# Patient Record
Sex: FEMALE | Race: WHITE | NOT HISPANIC OR LATINO
[De-identification: names, ages, dates, MRNs, and addresses within clinical notes are randomized per-mention and may not be internally consistent; named-entity substitution may affect disease eponyms.]

---

## 2018-06-11 ENCOUNTER — APPOINTMENT (OUTPATIENT)
Dept: MRI IMAGING | Facility: CLINIC | Age: 63
End: 2018-06-11
Payer: MEDICARE

## 2018-06-11 ENCOUNTER — OUTPATIENT (OUTPATIENT)
Dept: OUTPATIENT SERVICES | Facility: HOSPITAL | Age: 63
LOS: 1 days | End: 2018-06-11
Payer: MEDICARE

## 2018-06-11 DIAGNOSIS — C50.911 MALIGNANT NEOPLASM OF UNSPECIFIED SITE OF RIGHT FEMALE BREAST: ICD-10-CM

## 2018-06-11 PROCEDURE — 0159T: CPT | Mod: 26

## 2018-06-11 PROCEDURE — C8908: CPT

## 2018-06-11 PROCEDURE — 82565 ASSAY OF CREATININE: CPT

## 2018-06-11 PROCEDURE — A9585: CPT

## 2018-06-11 PROCEDURE — 77059 MRI BREAST BILATERAL: CPT | Mod: 26

## 2018-06-11 PROCEDURE — C8937: CPT

## 2018-06-15 ENCOUNTER — APPOINTMENT (OUTPATIENT)
Dept: MRI IMAGING | Facility: IMAGING CENTER | Age: 63
End: 2018-06-15
Payer: MEDICARE

## 2018-06-15 ENCOUNTER — OUTPATIENT (OUTPATIENT)
Dept: OUTPATIENT SERVICES | Facility: HOSPITAL | Age: 63
LOS: 1 days | End: 2018-06-15
Payer: MEDICARE

## 2018-06-15 ENCOUNTER — RESULT REVIEW (OUTPATIENT)
Age: 63
End: 2018-06-15

## 2018-06-15 DIAGNOSIS — R92.8 OTHER ABNORMAL AND INCONCLUSIVE FINDINGS ON DIAGNOSTIC IMAGING OF BREAST: ICD-10-CM

## 2018-06-15 PROCEDURE — 19085 BX BREAST 1ST LESION MR IMAG: CPT | Mod: RT

## 2018-06-15 PROCEDURE — 88305 TISSUE EXAM BY PATHOLOGIST: CPT

## 2018-06-15 PROCEDURE — 77065 DX MAMMO INCL CAD UNI: CPT

## 2018-06-15 PROCEDURE — 77065 DX MAMMO INCL CAD UNI: CPT | Mod: 26,RT

## 2018-06-15 PROCEDURE — 19085 BX BREAST 1ST LESION MR IMAG: CPT

## 2018-06-15 PROCEDURE — 88305 TISSUE EXAM BY PATHOLOGIST: CPT | Mod: 26

## 2018-06-18 LAB — SURGICAL PATHOLOGY STUDY: SIGNIFICANT CHANGE UP

## 2018-09-07 ENCOUNTER — EMERGENCY (EMERGENCY)
Facility: HOSPITAL | Age: 63
LOS: 1 days | Discharge: HOME | End: 2018-09-07
Attending: EMERGENCY MEDICINE | Admitting: INTERNAL MEDICINE
Payer: MEDICARE

## 2018-09-07 VITALS
HEART RATE: 94 BPM | SYSTOLIC BLOOD PRESSURE: 132 MMHG | TEMPERATURE: 98 F | OXYGEN SATURATION: 98 % | RESPIRATION RATE: 18 BRPM | DIASTOLIC BLOOD PRESSURE: 81 MMHG

## 2018-09-07 DIAGNOSIS — Z98.890 OTHER SPECIFIED POSTPROCEDURAL STATES: Chronic | ICD-10-CM

## 2018-09-07 DIAGNOSIS — Z90.710 ACQUIRED ABSENCE OF BOTH CERVIX AND UTERUS: Chronic | ICD-10-CM

## 2018-09-07 LAB
APTT BLD: > 200 SEC (ref 27.5–37.4)
BASOPHILS # BLD AUTO: 0.1 K/UL — SIGNIFICANT CHANGE UP (ref 0–0.2)
BASOPHILS NFR BLD AUTO: 0.6 % — SIGNIFICANT CHANGE UP (ref 0–2)
EOSINOPHIL # BLD AUTO: 0.1 K/UL — SIGNIFICANT CHANGE UP (ref 0–0.5)
EOSINOPHIL NFR BLD AUTO: 1.2 % — SIGNIFICANT CHANGE UP (ref 0–6)
GAS PNL BLDV: SIGNIFICANT CHANGE UP
HCT VFR BLD CALC: 47.1 % — HIGH (ref 34.5–45)
HGB BLD-MCNC: 15.6 G/DL — HIGH (ref 11.5–15.5)
INR BLD: 1.21 RATIO — HIGH (ref 0.88–1.16)
LYMPHOCYTES # BLD AUTO: 18.3 % — SIGNIFICANT CHANGE UP (ref 13–44)
LYMPHOCYTES # BLD AUTO: 2 K/UL — SIGNIFICANT CHANGE UP (ref 1–3.3)
MCHC RBC-ENTMCNC: 28.1 PG — SIGNIFICANT CHANGE UP (ref 27–34)
MCHC RBC-ENTMCNC: 33.1 GM/DL — SIGNIFICANT CHANGE UP (ref 32–36)
MCV RBC AUTO: 84.7 FL — SIGNIFICANT CHANGE UP (ref 80–100)
MONOCYTES # BLD AUTO: 0.7 K/UL — SIGNIFICANT CHANGE UP (ref 0–0.9)
MONOCYTES NFR BLD AUTO: 6.3 % — SIGNIFICANT CHANGE UP (ref 2–14)
NEUTROPHILS # BLD AUTO: 8 K/UL — HIGH (ref 1.8–7.4)
NEUTROPHILS NFR BLD AUTO: 73.6 % — SIGNIFICANT CHANGE UP (ref 43–77)
NT-PROBNP SERPL-SCNC: 320 PG/ML — HIGH (ref 0–300)
PLATELET # BLD AUTO: 186 K/UL — SIGNIFICANT CHANGE UP (ref 150–400)
PROTHROM AB SERPL-ACNC: 13.1 SEC — HIGH (ref 9.8–12.7)
RBC # BLD: 5.56 M/UL — HIGH (ref 3.8–5.2)
RBC # FLD: 13.7 % — SIGNIFICANT CHANGE UP (ref 10.3–14.5)
WBC # BLD: 10.9 K/UL — HIGH (ref 3.8–10.5)
WBC # FLD AUTO: 10.9 K/UL — HIGH (ref 3.8–10.5)

## 2018-09-07 RX ORDER — HEPARIN SODIUM 5000 [USP'U]/ML
10000 INJECTION INTRAVENOUS; SUBCUTANEOUS EVERY 6 HOURS
Qty: 0 | Refills: 0 | Status: DISCONTINUED | OUTPATIENT
Start: 2018-09-07 | End: 2018-09-08

## 2018-09-07 RX ORDER — HEPARIN SODIUM 5000 [USP'U]/ML
INJECTION INTRAVENOUS; SUBCUTANEOUS
Qty: 25000 | Refills: 0 | Status: DISCONTINUED | OUTPATIENT
Start: 2018-09-07 | End: 2018-09-08

## 2018-09-07 RX ORDER — HEPARIN SODIUM 5000 [USP'U]/ML
5000 INJECTION INTRAVENOUS; SUBCUTANEOUS EVERY 6 HOURS
Qty: 0 | Refills: 0 | Status: DISCONTINUED | OUTPATIENT
Start: 2018-09-07 | End: 2018-09-08

## 2018-09-07 NOTE — ED PROVIDER NOTE - MEDICAL DECISION MAKING DETAILS
64 yo F c PMH of HTN, varicose veins (baseline LLE swelling), recently dx with breast ca (s/p lumpectomy in July 2018), not on chemotx, scheduled for upcoming radiation transferred via EMS from [facility] where she was found to have PE and bilat DVTs after having SOB. On exam, HR 94, SPO2 94 when pt was talking was placed on 3LNC, pt already heparin loaded. labs, ekg, and cxr pending; will continue AC and admit for further w/u

## 2018-09-07 NOTE — ED PROVIDER NOTE - PROGRESS NOTE DETAILS
Roel Blas, PGY3: PTT>200, heparin held. Discussed with cards fellow. Will hold until repeat ptt on floor in PICU. Admitting to Dr. Alcazar

## 2018-09-07 NOTE — ED PROVIDER NOTE - ATTENDING CONTRIBUTION TO CARE
64 yo F c PMH of HTN, varicose veins (baseline LLE swelling), recently dx with breast ca (s/p lumpectomy in July 2018), not on chemotx, scheduled for upcoming radiation transferred via EMS from [facility] for hemodynamically stable pe, with b/l le dvt. Discussed with cards who accepted patient on heparin drip, labs ordered.

## 2018-09-07 NOTE — ED PROVIDER NOTE - OBJECTIVE STATEMENT
64 yo F c PMH of HTN, varicose veins (baseline LLE swelling), recently dx with breast ca (s/p lumpectomy in July 2018), not on chemotx, scheduled for upcoming radiation. 64 yo F c PMH of HTN, varicose veins (baseline LLE swelling), recently dx with breast ca (s/p lumpectomy in July 2018), not on chemotx, scheduled for upcoming radiation transferred via EMS from [facility] where she was found to have PE and bilat DVTs (is s/p 25,000 units heparin loading at 15 mL/hour) after she had SOB that started at noon today while she was at a meeting. no personal or FH of DVT or PE. not on AC. no recent travel.     ROS positive: SOB, generalized weakness  ROS negative: f/c, congestion, coryza, pharyngitis, hemoptysis, CP, hemoptysis, abdominal pain, n/v, dysuria, hematuria, increased leg swelling from baseline, syncope

## 2018-09-07 NOTE — ED ADULT NURSE NOTE - OBJECTIVE STATEMENT
63y female with recently dx breast ca s/p lumpectomy right side transferred from [facility] for bilateral PEs and DVTs. Pt is alert and oriented x 4 and speaking coherently. 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 pt was found to have the PEs and DVTs and given heparin at [facility]. Pt is in nad, vss. pt denies cp, sob, n/v/d, fevers, chills. heparin paused in [place] ER until results of coagulation blood work returns. Pt sitting comfortably, NC applied for comfort. swelling noted to b/l legs. swelling greater to right leg. right leg warm to touch and reddened. PT on CM-nsr. 63y female with recently dx breast ca s/p lumpectomy right side transferred from [facility] for bilateral PEs and DVTs. Pt is alert and oriented x 4 and speaking coherently. 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 pt was found to have the PEs and DVTs and given heparin at [facility]. Pt is in nad, vss. pt denies cp, sob, n/v/d, fevers, chills. heparin paused in [place] ER until results of coagulation blood work returns. Pt sitting comfortably, NC applied for comfort. swelling noted to b/l legs. swelling greater to right leg. right leg warm to touch and reddened. PT on CM-nsr. md lovell completed. will reassess.

## 2018-09-08 ENCOUNTER — TRANSCRIPTION ENCOUNTER (OUTPATIENT)
Age: 63
End: 2018-09-08

## 2018-09-08 DIAGNOSIS — I26.99 OTHER PULMONARY EMBOLISM WITHOUT ACUTE COR PULMONALE: ICD-10-CM

## 2018-09-08 DIAGNOSIS — C50.919 MALIGNANT NEOPLASM OF UNSPECIFIED SITE OF UNSPECIFIED FEMALE BREAST: ICD-10-CM

## 2018-09-08 DIAGNOSIS — I82.4Y3 ACUTE EMBOLISM AND THROMBOSIS OF UNSPECIFIED DEEP VEINS OF PROXIMAL LOWER EXTREMITY, BILATERAL: ICD-10-CM

## 2018-09-08 LAB
ALBUMIN SERPL ELPH-MCNC: 4.1 G/DL — SIGNIFICANT CHANGE UP (ref 3.3–5)
ALP SERPL-CCNC: 99 U/L — SIGNIFICANT CHANGE UP (ref 40–120)
ALT FLD-CCNC: 19 U/L — SIGNIFICANT CHANGE UP (ref 10–45)
ANION GAP SERPL CALC-SCNC: 13 MMOL/L — SIGNIFICANT CHANGE UP (ref 5–17)
ANION GAP SERPL CALC-SCNC: 17 MMOL/L — SIGNIFICANT CHANGE UP (ref 5–17)
APTT BLD: 175.8 SEC — CRITICAL HIGH (ref 27.5–37.4)
AST SERPL-CCNC: 28 U/L — SIGNIFICANT CHANGE UP (ref 10–40)
BILIRUB SERPL-MCNC: 0.4 MG/DL — SIGNIFICANT CHANGE UP (ref 0.2–1.2)
BLD GP AB SCN SERPL QL: NEGATIVE — SIGNIFICANT CHANGE UP
BUN SERPL-MCNC: 13 MG/DL — SIGNIFICANT CHANGE UP (ref 7–23)
BUN SERPL-MCNC: 15 MG/DL — SIGNIFICANT CHANGE UP (ref 7–23)
CALCIUM SERPL-MCNC: 9.4 MG/DL — SIGNIFICANT CHANGE UP (ref 8.4–10.5)
CALCIUM SERPL-MCNC: 9.4 MG/DL — SIGNIFICANT CHANGE UP (ref 8.4–10.5)
CHLORIDE SERPL-SCNC: 104 MMOL/L — SIGNIFICANT CHANGE UP (ref 96–108)
CHLORIDE SERPL-SCNC: 106 MMOL/L — SIGNIFICANT CHANGE UP (ref 96–108)
CHOLEST SERPL-MCNC: 186 MG/DL — SIGNIFICANT CHANGE UP (ref 10–199)
CO2 SERPL-SCNC: 19 MMOL/L — LOW (ref 22–31)
CO2 SERPL-SCNC: 23 MMOL/L — SIGNIFICANT CHANGE UP (ref 22–31)
CREAT SERPL-MCNC: 0.85 MG/DL — SIGNIFICANT CHANGE UP (ref 0.5–1.3)
CREAT SERPL-MCNC: 0.88 MG/DL — SIGNIFICANT CHANGE UP (ref 0.5–1.3)
GLUCOSE SERPL-MCNC: 114 MG/DL — HIGH (ref 70–99)
GLUCOSE SERPL-MCNC: 114 MG/DL — HIGH (ref 70–99)
HBA1C BLD-MCNC: 5.4 % — SIGNIFICANT CHANGE UP (ref 4–5.6)
HCT VFR BLD CALC: 43.7 % — SIGNIFICANT CHANGE UP (ref 34.5–45)
HDLC SERPL-MCNC: 45 MG/DL — LOW
HGB BLD-MCNC: 14.8 G/DL — SIGNIFICANT CHANGE UP (ref 11.5–15.5)
INR BLD: 1.11 RATIO — SIGNIFICANT CHANGE UP (ref 0.88–1.16)
LIPID PNL WITH DIRECT LDL SERPL: 120 MG/DL — SIGNIFICANT CHANGE UP
MAGNESIUM SERPL-MCNC: 2.2 MG/DL — SIGNIFICANT CHANGE UP (ref 1.6–2.6)
MCHC RBC-ENTMCNC: 28.7 PG — SIGNIFICANT CHANGE UP (ref 27–34)
MCHC RBC-ENTMCNC: 33.8 GM/DL — SIGNIFICANT CHANGE UP (ref 32–36)
MCV RBC AUTO: 84.9 FL — SIGNIFICANT CHANGE UP (ref 80–100)
PHOSPHATE SERPL-MCNC: 3.6 MG/DL — SIGNIFICANT CHANGE UP (ref 2.5–4.5)
PLATELET # BLD AUTO: 176 K/UL — SIGNIFICANT CHANGE UP (ref 150–400)
POTASSIUM SERPL-MCNC: 4.1 MMOL/L — SIGNIFICANT CHANGE UP (ref 3.5–5.3)
POTASSIUM SERPL-MCNC: 4.6 MMOL/L — SIGNIFICANT CHANGE UP (ref 3.5–5.3)
POTASSIUM SERPL-SCNC: 4.1 MMOL/L — SIGNIFICANT CHANGE UP (ref 3.5–5.3)
POTASSIUM SERPL-SCNC: 4.6 MMOL/L — SIGNIFICANT CHANGE UP (ref 3.5–5.3)
PROT SERPL-MCNC: 7.2 G/DL — SIGNIFICANT CHANGE UP (ref 6–8.3)
PROTHROM AB SERPL-ACNC: 12 SEC — SIGNIFICANT CHANGE UP (ref 9.8–12.7)
RBC # BLD: 5.14 M/UL — SIGNIFICANT CHANGE UP (ref 3.8–5.2)
RBC # FLD: 13.8 % — SIGNIFICANT CHANGE UP (ref 10.3–14.5)
RH IG SCN BLD-IMP: POSITIVE — SIGNIFICANT CHANGE UP
SODIUM SERPL-SCNC: 140 MMOL/L — SIGNIFICANT CHANGE UP (ref 135–145)
SODIUM SERPL-SCNC: 142 MMOL/L — SIGNIFICANT CHANGE UP (ref 135–145)
TOTAL CHOLESTEROL/HDL RATIO MEASUREMENT: 4.1 RATIO — SIGNIFICANT CHANGE UP (ref 3.3–7.1)
TRIGL SERPL-MCNC: 104 MG/DL — SIGNIFICANT CHANGE UP (ref 10–149)
TROPONIN T, HIGH SENSITIVITY RESULT: 108 NG/L — HIGH (ref 0–51)
TROPONIN T, HIGH SENSITIVITY RESULT: 198 NG/L — HIGH (ref 0–51)
WBC # BLD: 8.1 K/UL — SIGNIFICANT CHANGE UP (ref 3.8–10.5)
WBC # FLD AUTO: 8.1 K/UL — SIGNIFICANT CHANGE UP (ref 3.8–10.5)

## 2018-09-08 PROCEDURE — 93306 TTE W/DOPPLER COMPLETE: CPT | Mod: 26,76

## 2018-09-08 RX ORDER — HEPARIN SODIUM 5000 [USP'U]/ML
4500 INJECTION INTRAVENOUS; SUBCUTANEOUS EVERY 6 HOURS
Qty: 0 | Refills: 0 | Status: DISCONTINUED | OUTPATIENT
Start: 2018-09-08 | End: 2018-09-08

## 2018-09-08 RX ORDER — ENOXAPARIN SODIUM 100 MG/ML
120 INJECTION SUBCUTANEOUS EVERY 12 HOURS
Qty: 0 | Refills: 0 | Status: DISCONTINUED | OUTPATIENT
Start: 2018-09-08 | End: 2018-09-09

## 2018-09-08 RX ORDER — HEPARIN SODIUM 5000 [USP'U]/ML
9500 INJECTION INTRAVENOUS; SUBCUTANEOUS EVERY 6 HOURS
Qty: 0 | Refills: 0 | Status: DISCONTINUED | OUTPATIENT
Start: 2018-09-08 | End: 2018-09-08

## 2018-09-08 RX ORDER — HEPARIN SODIUM 5000 [USP'U]/ML
INJECTION INTRAVENOUS; SUBCUTANEOUS
Qty: 25000 | Refills: 0 | Status: DISCONTINUED | OUTPATIENT
Start: 2018-09-08 | End: 2018-09-08

## 2018-09-08 RX ORDER — INFLUENZA VIRUS VACCINE 15; 15; 15; 15 UG/.5ML; UG/.5ML; UG/.5ML; UG/.5ML
0.5 SUSPENSION INTRAMUSCULAR ONCE
Qty: 0 | Refills: 0 | Status: COMPLETED | OUTPATIENT
Start: 2018-09-08 | End: 2018-09-08

## 2018-09-08 RX ORDER — HEPARIN SODIUM 5000 [USP'U]/ML
1800 INJECTION INTRAVENOUS; SUBCUTANEOUS
Qty: 25000 | Refills: 0 | Status: DISCONTINUED | OUTPATIENT
Start: 2018-09-08 | End: 2018-09-08

## 2018-09-08 RX ADMIN — ENOXAPARIN SODIUM 120 MILLIGRAM(S): 100 INJECTION SUBCUTANEOUS at 14:49

## 2018-09-08 RX ADMIN — HEPARIN SODIUM 1400 UNIT(S)/HR: 5000 INJECTION INTRAVENOUS; SUBCUTANEOUS at 09:30

## 2018-09-08 RX ADMIN — HEPARIN SODIUM 0 UNIT(S)/HR: 5000 INJECTION INTRAVENOUS; SUBCUTANEOUS at 08:27

## 2018-09-08 RX ADMIN — HEPARIN SODIUM 1800 UNIT(S)/HR: 5000 INJECTION INTRAVENOUS; SUBCUTANEOUS at 01:47

## 2018-09-08 NOTE — H&P ADULT - NSHPPHYSICALEXAM_GEN_ALL_CORE
General: Resting comfortably in bed, NAD  Neurology: A&Ox4, nonfocal, TALLEY x 4  Respiratory: No increased respiratory effort, on nasal cannula 4L/min, CTA b/l without rales, wheezes, or rhonchi  CV: RRR, S1S2, no murmurs, rubs or gallops  Abdominal: Soft, NT, ND +BS  Extremities: No edema, + peripheral pulses, RLE>LLE

## 2018-09-08 NOTE — H&P ADULT - NSHPSOCIALHISTORY_GEN_ALL_CORE
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 Denies tobacco, alcohol or substance abuse.

## 2018-09-08 NOTE — CHART NOTE - NSCHARTNOTEFT_GEN_A_CORE
====================  CCU MIDNIGHT ROUNDS  ====================    EDMUND RODRIGUEZ  [id number]    ====================  SUMMARY: HPI:  Pt is a 64 y/o F with PMH of HTN, varicose veins (baseline RLE swelling), recently dx with breast ca (s/p right lumpectomy 7/5/18), no chemo, scheduled for upcoming radiation transferred via EMS from [facility] where she was found to have PEs and b/l proximal LE DVTs, s/p 25,000 U heparin loading at 15 mL/hour). Patient initially had ALVAREZ around 2pm yesterday while at work ([facility]), resolved with rest but upon walking to her car experienced increased ALVAREZ accompanied by anxiety. Pt call her PMD who instructed her to go to the ED. Denies personal or FH of DVT or PE. Not on AC. No recent travel but admit to more sedentary lifestyle recently due to the hot weather. Pt was on continued heparin gtt at 22 mL/hr in [facility] ED, PTT >200 and held for 2 hours. Heparin restarted at 18 ml/hr upon arrival to [facility]. Currently pt denies fevers, chills, CP, SOB, palpitations, abd pain, n/v/d, weakness, LE pain, numbness or tingling. (08 Sep 2018 01:23)    ====================        ====================  NEW EVENTS:  ====================  Resting comfortably in chair without SOB, CP, palpitations. Improved ALVAREZ with walking to bathroom throughout the day. RLE with occasional aching.       ====================  VITALS (Last 12 hrs):  ====================    T(C): 37.1 (09-08-18 @ 20:00), Max: 37.1 (09-08-18 @ 20:00)  HR: 81 (09-08-18 @ 21:00) (77 - 86)  BP: 112/69 (09-08-18 @ 21:00) (91/73 - 138/119)  BP(mean): 83 (09-08-18 @ 21:00) (81 - 127)  RR: 20 (09-08-18 @ 19:00) (13 - 26)  SpO2: 97% (09-08-18 @ 21:00) (95% - 100%)      TELEMETRY: NSR rate 80s      I&O's Summary    07 Sep 2018 07:01  -  08 Sep 2018 07:00  --------------------------------------------------------  IN: 108 mL / OUT: 50 mL / NET: 58 mL    08 Sep 2018 07:01  -  08 Sep 2018 22:13  --------------------------------------------------------  IN: 434 mL / OUT: 600 mL / NET: -166 mL        ====================  PLAN:  ====================    #Pulmonary embolisms  - CTA Chest [facility] shows PE in distal Left Pulm Artery involving L Upper and Lower lobe seg arteries; PE in R seg branches of Upper, Middle and Lower lobes.  - cont Lovenox 120mg SC BID  - Vasc Intervention consulted.   - Will likely DC home in AM with Lovenox therapy    #DVTs  - DVT, lower extremity, proximal, acute, bilateral.  Plan: [facility] CT Venogram shows R prox and distal superficial femoral vein and popliteal vein with defects consistent with DVT. L distal superficial vein and popliteal vein with defects suggestive of possible DVT  - Lovenox ordered.       Wes Mejia, CCU PA   #[id number]

## 2018-09-08 NOTE — H&P ADULT - ASSESSMENT
63F with PMH of HTN, varicose veins (baseline RLE swelling), recently dx with breast ca (s/p lumpectomy 7/5/18), no chemo, scheduled for upcoming radiation transferred from OSH with b/l prox LE DVTs with multiple PEs on CTPA, currently stable.

## 2018-09-08 NOTE — GOALS OF CARE CONVERSATION - PERSONAL ADVANCE DIRECTIVE - CONVERSATION DETAILS
Weekend Covering SW     As per H & P, "Pt is a 62 y/o F with PMH of HTN, varicose veins (baseline RLE swelling), recently dx with breast ca (s/p right lumpectomy 7/5/18), no chemo, scheduled for upcoming radiation transferred via EMS from [facility] where she was found to have PEs and b/l proximal LE DVTs, s/p 25,000 U heparin loading at 15 mL/hour). Patient initially had ALVAREZ around 2pm yesterday while at work ([employer]), resolved with rest but upon walking to her car experienced increased ALVAREZ accompanied by anxiety. Pt call her PMD who instructed her to go to the ED. Denies personal or FH of DVT or PE. Not on AC. No recent travel but admit to more sedentary lifestyle recently due to the hot weather. Pt was on continued heparin gtt at 22 mL/hr in [facility] ED, PTT >200 and held for 2 hours. Heparin restarted at 18 ml/hr upon arrival to [facility]. Currently pt denies fevers, chills, CP, SOB, palpitations, abd pain, n/v/d, weakness, LE pain, numbness or tingling."  Patient seen for Day 1 Assessment.  She is unable to identify a caregiver as she has no children and has been the primary caregiver for her spouse.     LCSW completed HCP with patient. Patient designated her  Roscoe Barron as primary -[phone] and her sister Linnea Mei [phone] as her alterate.  Original plus 2 copies returned to patient. Copy of HCP placed on chart and copy scanned into [employer].       Patient lives in private home 1/2 step outside, Patient and bedroom on the first floor. No assitive devices for ambulation for patient prior to admission. No prior homecare or aide services in place. Patient has been primary caregiver for spouse who has multiple medical needs.  She does not have any children.     Spouse with carotid disease, COPD, current smoker, heart disease, sleep apena, cyst on kidney, neuropathy, diabetic, problems with collapsed vertebrae in spine, Spouse uses wheelchair outside the house and unable to drive due to neurpathy in feet. Wife was 's caregiver.  Patient does all grocery shopping, laundry. Patient drives. She denies any homecare services or assistance in the home.  Spouse can microwave meals while patient in hospital.  Spouse is a .  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  Patient was working on [employer].     Patient with recent new dx of breast cancer  in April. Surgery in June.  Scheduled to start radiation on 9/17.      PCP is Dr. Alireza Yusuf - [phone]. Patient requests primary team contact her PCP to provide updates and collaborate.     Patient hasn't eaten since last night and expresses hunger.  Her cell phone numer is [phone].    Supportive counseling provided for medical decline.  Patient may benefit from some homecare services upon discharge pending hospital course. No additional needs or concerns identified at this time.  Discharge as per primary CM/SW.

## 2018-09-08 NOTE — PROGRESS NOTE ADULT - SUBJECTIVE AND OBJECTIVE BOX
CHIEF COMPLAINT::    INTERVAL HISTORY:    REVIEW OF SYSTEMS: all others negative    MEDICATIONS  (STANDING):  heparin  Infusion. 1800 Unit(s)/Hr (18 mL/Hr) IV Continuous <Continuous>  influenza   Vaccine 0.5 milliLiter(s) IntraMuscular once    MEDICATIONS  (PRN):  heparin  Injectable 9500 Unit(s) IV Push every 6 hours PRN For aPTT less than 40  heparin  Injectable 4500 Unit(s) IV Push every 6 hours PRN For aPTT between 40 - 57      Objective:  Vital Signs Last 24 Hrs  T(C): 36.7 (08 Sep 2018 06:00), Max: 36.7 (07 Sep 2018 22:47)  T(F): 98 (08 Sep 2018 06:00), Max: 98 (07 Sep 2018 22:47)  HR: 82 (08 Sep 2018 07:00) (78 - 98)  BP: 119/79 (08 Sep 2018 07:00) (110/80 - 132/81)  BP(mean): 93 (08 Sep 2018 07:00) (80 - 96)  RR: 16 (08 Sep 2018 07:00) (15 - 21)  SpO2: 100% (08 Sep 2018 07:00) (92% - 100%)  ICU Vital Signs Last 24 Hrs  T(C): 36.7 (08 Sep 2018 06:00), Max: 36.7 (07 Sep 2018 22:47)  T(F): 98 (08 Sep 2018 06:00), Max: 98 (07 Sep 2018 22:47)  HR: 82 (08 Sep 2018 07:00) (78 - 98)  BP: 119/79 (08 Sep 2018 07:00) (110/80 - 132/81)  BP(mean): 93 (08 Sep 2018 07:00) (80 - 96)  ABP: --  ABP(mean): --  RR: 16 (08 Sep 2018 07:00) (15 - 21)  SpO2: 100% (08 Sep 2018 07:00) (92% - 100%)      Adult Advanced Hemodynamics Last 24 Hrs  CVP(mm Hg): --  CVP(cm H2O): --  CO: --  CI: --  PA: --  PA(mean): --  PCWP: --  SVR: --  SVRI: --  PVR: --  PVRI: --      09-07 @ 07:01  -  09-08 @ 07:00  --------------------------------------------------------  IN: 90 mL / OUT: 50 mL / NET: 40 mL      Daily Height in cm: 167.64 (08 Sep 2018 01:15)    Daily     PHYSICAL EXAM:      Constitutional:    Eyes:    ENMT:    Neck:    Breasts:    Back:    Respiratory:    Cardiovascular:    Gastrointestinal:    Genitourinary:    Rectal:    Extremities:    Vascular:    Neurological:    Skin:    Lymph Nodes:    Musculoskeletal:    Psychiatric:        TELEMETRY:     EKG:     CARDIAC CATH:     ECHO:    IMAGING:                           15.6   10.9  )-----------( 186      ( 07 Sep 2018 23:19 )             47.1     09-07    140  |  104  |  15  ----------------------------<  114<H>  4.6   |  19<L>  |  0.88    Ca    9.4      07 Sep 2018 23:19    TPro  7.2  /  Alb  4.1  /  TBili  0.4  /  DBili  x   /  AST  28  /  ALT  19  /  AlkPhos  99  09-07    LIVER FUNCTIONS - ( 07 Sep 2018 23:19 )  Alb: 4.1 g/dL / Pro: 7.2 g/dL / ALK PHOS: 99 U/L / ALT: 19 U/L / AST: 28 U/L / GGT: x           PT/INR - ( 07 Sep 2018 23:19 )   PT: 13.1 sec;   INR: 1.21 ratio         PTT - ( 07 Sep 2018 23:19 )  PTT:> 200 sec      *BLOOD GAS/ARTERIAL/MIXED/VENOUS  *LACTATE      HEALTH ISSUES - PROBLEM Dx:  Pulmonary thromboembolism  Breast CA: Breast CA  DVT, lower extremity, proximal, acute, bilateral: DVT, lower extremity, proximal, acute, bilateral  Pulmonary embolism: Pulmonary embolism        HEALTH ISSUES - R/O PROBLEM Dx:      JOÃO ContrerasU NP   # CHIEF COMPLAINT::    INTERVAL HISTORY:  This is a 63 year old woman with PMH of HTN, varicose veins (baseline RLE swelling), recently dx with breast ca (s/p right lumpectomy 7/5/18), no chemo, scheduled for upcoming radiation transferred via EMS from [facility] where she was found to have PEs and b/l proximal LE DVTs, s/p 25,000 U heparin loading at 15 mL/hour). Patient initially had ALVAREZ around 2pm yesterday while at work ([facility]), resolved with rest but upon walking to her car experienced increased ALVAREZ accompanied by anxiety. Pt call her PMD who instructed her to go to the ED. Denies personal or FH of DVT or PE. Not on AC. No recent travel but admit to more sedentary lifestyle recently due to the hot weather. Pt started on Heparin.     REVIEW OF SYSTEMS: all others negative    MEDICATIONS  (STANDING):  heparin  Infusion. 1800 Unit(s)/Hr (18 mL/Hr) IV Continuous <Continuous>  influenza   Vaccine 0.5 milliLiter(s) IntraMuscular once    MEDICATIONS  (PRN):  heparin  Injectable 9500 Unit(s) IV Push every 6 hours PRN For aPTT less than 40  heparin  Injectable 4500 Unit(s) IV Push every 6 hours PRN For aPTT between 40 - 57      Objective:  Vital Signs Last 24 Hrs  T(C): 36.7 (08 Sep 2018 06:00), Max: 36.7 (07 Sep 2018 22:47)  T(F): 98 (08 Sep 2018 06:00), Max: 98 (07 Sep 2018 22:47)  HR: 82 (08 Sep 2018 07:00) (78 - 98)  BP: 119/79 (08 Sep 2018 07:00) (110/80 - 132/81)  BP(mean): 93 (08 Sep 2018 07:00) (80 - 96)  RR: 16 (08 Sep 2018 07:00) (15 - 21)  SpO2: 100% (08 Sep 2018 07:00) (92% - 100%)  ICU Vital Signs Last 24 Hrs  T(C): 36.7 (08 Sep 2018 06:00), Max: 36.7 (07 Sep 2018 22:47)  T(F): 98 (08 Sep 2018 06:00), Max: 98 (07 Sep 2018 22:47)  HR: 82 (08 Sep 2018 07:00) (78 - 98)  BP: 119/79 (08 Sep 2018 07:00) (110/80 - 132/81)  BP(mean): 93 (08 Sep 2018 07:00) (80 - 96)  ABP: --  ABP(mean): --  RR: 16 (08 Sep 2018 07:00) (15 - 21)  SpO2: 100% (08 Sep 2018 07:00) (92% - 100%)      Adult Advanced Hemodynamics Last 24 Hrs  CVP(mm Hg): --  CVP(cm H2O): --  CO: --  CI: --  PA: --  PA(mean): --  PCWP: --  SVR: --  SVRI: --  PVR: --  PVRI: --      09-07 @ 07:01  -  09-08 @ 07:00  --------------------------------------------------------  IN: 90 mL / OUT: 50 mL / NET: 40 mL      Daily Height in cm: 167.64 (08 Sep 2018 01:15)    Daily     PHYSICAL EXAM:      Constitutional:    Eyes:    ENMT:    Neck:    Breasts:    Back:    Respiratory:    Cardiovascular:    Gastrointestinal:    Genitourinary:    Rectal:    Extremities:    Vascular:    Neurological:    Skin:    Lymph Nodes:    Musculoskeletal:    Psychiatric:        TELEMETRY:     EKG:     CARDIAC CATH:     ECHO:    IMAGING:                           15.6   10.9  )-----------( 186      ( 07 Sep 2018 23:19 )             47.1     09-07    140  |  104  |  15  ----------------------------<  114<H>  4.6   |  19<L>  |  0.88    Ca    9.4      07 Sep 2018 23:19    TPro  7.2  /  Alb  4.1  /  TBili  0.4  /  DBili  x   /  AST  28  /  ALT  19  /  AlkPhos  99  09-07    LIVER FUNCTIONS - ( 07 Sep 2018 23:19 )  Alb: 4.1 g/dL / Pro: 7.2 g/dL / ALK PHOS: 99 U/L / ALT: 19 U/L / AST: 28 U/L / GGT: x           PT/INR - ( 07 Sep 2018 23:19 )   PT: 13.1 sec;   INR: 1.21 ratio         PTT - ( 07 Sep 2018 23:19 )  PTT:> 200 sec      *BLOOD GAS/ARTERIAL/MIXED/VENOUS  *LACTATE      HEALTH ISSUES - PROBLEM Dx:  Pulmonary thromboembolism  Breast CA: Breast CA  DVT, lower extremity, proximal, acute, bilateral: DVT, lower extremity, proximal, acute, bilateral  Pulmonary embolism: Pulmonary embolism        HEALTH ISSUES - R/O PROBLEM Dx:      Edwin Livingston, CCU NP   # CHIEF COMPLAINT: Pulmonary Embolism    INTERVAL HISTORY:  This is a 63 year old woman with PMH of HTN, varicose veins (baseline RLE swelling), recently dx with breast ca (s/p right lumpectomy 7/5/18), no chemo, scheduled for upcoming radiation transferred via EMS from [facility] where she was found to have PEs and b/l proximal LE DVTs, s/p 25,000 U heparin loading at 15 mL/hour). Patient initially had ALVAREZ around 2pm yesterday while at work ([facility]), resolved with rest but upon walking to her car experienced increased ALVAREZ accompanied by anxiety. Pt call her PMD who instructed her to go to the ED. Denies personal or FH of DVT or PE. Not on AC. No recent travel but admit to more sedentary lifestyle recently due to the hot weather. Pt started on Heparin.     REVIEW OF SYSTEMS: Denies CP, SOB, all others negative    MEDICATIONS  (STANDING):  heparin  Infusion. 1800 Unit(s)/Hr (18 mL/Hr) IV Continuous <Continuous>  influenza   Vaccine 0.5 milliLiter(s) IntraMuscular once    MEDICATIONS  (PRN):  heparin  Injectable 9500 Unit(s) IV Push every 6 hours PRN For aPTT less than 40  heparin  Injectable 4500 Unit(s) IV Push every 6 hours PRN For aPTT between 40 - 57      Objective:  Vital Signs Last 24 Hrs  T(C): 36.7 (08 Sep 2018 06:00), Max: 36.7 (07 Sep 2018 22:47)  T(F): 98 (08 Sep 2018 06:00), Max: 98 (07 Sep 2018 22:47)  HR: 82 (08 Sep 2018 07:00) (78 - 98)  BP: 119/79 (08 Sep 2018 07:00) (110/80 - 132/81)  BP(mean): 93 (08 Sep 2018 07:00) (80 - 96)  RR: 16 (08 Sep 2018 07:00) (15 - 21)  SpO2: 100% (08 Sep 2018 07:00) (92% - 100%)  ICU Vital Signs Last 24 Hrs  T(C): 36.7 (08 Sep 2018 06:00), Max: 36.7 (07 Sep 2018 22:47)  T(F): 98 (08 Sep 2018 06:00), Max: 98 (07 Sep 2018 22:47)  HR: 82 (08 Sep 2018 07:00) (78 - 98)  BP: 119/79 (08 Sep 2018 07:00) (110/80 - 132/81)  BP(mean): 93 (08 Sep 2018 07:00) (80 - 96)  ABP: --  ABP(mean): --  RR: 16 (08 Sep 2018 07:00) (15 - 21)  SpO2: 100% (08 Sep 2018 07:00) (92% - 100%)      Adult Advanced Hemodynamics Last 24 Hrs  CVP(mm Hg): --  CVP(cm H2O): --  CO: --  CI: --  PA: --  PA(mean): --  PCWP: --  SVR: --  SVRI: --  PVR: --  PVRI: --      09-07 @ 07:01  -  09-08 @ 07:00  --------------------------------------------------------  IN: 90 mL / OUT: 50 mL / NET: 40 mL      Daily Height in cm: 167.64 (08 Sep 2018 01:15)    Daily     PHYSICAL EXAM:      Constitutional: No acute distress    Neuro A+O X 3    Respiratory: CTA Bilaterally    Cardiovascular: S1S2 RRR    Gastrointestinal: Distended, non tender, +BS    Extremities: Trace edema    Vascular: +1 pedal pulses        TELEMETRY: SR 70's    EKG:     CARDIAC CATH:     ECHO:    IMAGING:                           15.6   10.9  )-----------( 186      ( 07 Sep 2018 23:19 )             47.1     09-07    140  |  104  |  15  ----------------------------<  114<H>  4.6   |  19<L>  |  0.88    Ca    9.4      07 Sep 2018 23:19    TPro  7.2  /  Alb  4.1  /  TBili  0.4  /  DBili  x   /  AST  28  /  ALT  19  /  AlkPhos  99  09-07    LIVER FUNCTIONS - ( 07 Sep 2018 23:19 )  Alb: 4.1 g/dL / Pro: 7.2 g/dL / ALK PHOS: 99 U/L / ALT: 19 U/L / AST: 28 U/L / GGT: x           PT/INR - ( 07 Sep 2018 23:19 )   PT: 13.1 sec;   INR: 1.21 ratio         PTT - ( 07 Sep 2018 23:19 )  PTT:> 200 sec      *BLOOD GAS/ARTERIAL/MIXED/VENOUS  *LACTATE      HEALTH ISSUES - PROBLEM Dx:  Pulmonary thromboembolism  Breast CA: Breast CA  DVT, lower extremity, proximal, acute, bilateral: DVT, lower extremity, proximal, acute, bilateral  Pulmonary embolism: Pulmonary embolism        HEALTH ISSUES - R/O PROBLEM Dx:      Edwin Livingston, CCU NP   #[id number]

## 2018-09-08 NOTE — H&P ADULT - PROBLEM SELECTOR PLAN 2
- cont heparin gtt full AC nomogram as above - CT venogram at OSH with DVT in right prox and distal superficial femoral vein and popliteal vein. Probable DVT in left superficial femoral vein and popliteal vein.  - cont heparin gtt full AC nomogram as above

## 2018-09-08 NOTE — H&P ADULT - HISTORY OF PRESENT ILLNESS
62 yo F c PMH of HTN, varicose veins (baseline LLE swelling), recently dx with breast ca (s/p lumpectomy in July 2018), not on chemotx, scheduled for upcoming radiation transferred via EMS from [facility] where she was found to have PE and bilat DVTs (is s/p 25,000 units heparin loading at 15 mL/hour) after she had SOB that started at noon today while she was at a meeting. no personal or FH of DVT or PE. not on AC. no recent travel. Pt is a 64 y/o F with PMH of HTN, varicose veins (baseline RLE swelling), recently dx with breast ca (s/p right lumpectomy 7/5/18), no chemo, scheduled for upcoming radiation transferred via EMS from [facility] where she was found to have PEs and b/l proximal LE DVTs, s/p 25,000 U heparin loading at 15 mL/hour). Patient initially had ALVAREZ around 2pm yesterday while at work ([facility]), resolved with rest but upon walking to her car experienced increased ALVAREZ accompanied by anxiety. Pt call her PMD who instructed her to go to the ED. Denies personal or FH of DVT or PE. Not on AC. No recent travel but admit to more sedentary lifestyle recently due to the hot weather. Pt was on continued heparin gtt at 22 mL/hr in [facility] ED, PTT >200 and held for 2 hours. Heparin restarted at 18 ml/hr upon arrival to [facility]. Currently pt denies fevers, chills, CP, SOB, palpitations, abd pain, n/v/d, weakness, LE pain, numbness or tingling.

## 2018-09-08 NOTE — PROGRESS NOTE ADULT - PROBLEM SELECTOR PLAN 1
CTA Chest NUMC shows PE in distal Left Pulm Artery involving L Upper and Lower lobe seg arteries; PE in R seg branches of Upper, Middle and Lower lobes.  Heparin gtts convert to Lovenox 120mg SC BID  Vasc Intervention consulted

## 2018-09-08 NOTE — H&P ADULT - NSHPLABSRESULTS_GEN_ALL_CORE
15.6   10.9  )-----------( 186      ( 07 Sep 2018 23:19 )             47.1       09-07    140  |  104  |  15  ----------------------------<  114<H>  4.6   |  19<L>  |  0.88    Ca    9.4      07 Sep 2018 23:19    TPro  7.2  /  Alb  4.1  /  TBili  0.4  /  DBili  x   /  AST  28  /  ALT  19  /  AlkPhos  99  09-07                  PT/INR - ( 07 Sep 2018 23:19 )   PT: 13.1 sec;   INR: 1.21 ratio         PTT - ( 07 Sep 2018 23:19 )  PTT:> 200 sec      TELEMETRY: NSR with PVCs      IMAGING    < from: Xray Chest 1 View- PORTABLE-Urgent (09.07.18 @ 23:34) >    ROCEDURE DATE:  09/07/2018      ******PRELIMINARY REPORT******    ******PRELIMINARY REPORT******           INTERPRETATION:  clear lungs    < end of copied text > 15.6   10.9  )-----------( 186      ( 07 Sep 2018 23:19 )             47.1       09-07    140  |  104  |  15  ----------------------------<  114<H>  4.6   |  19<L>  |  0.88    Ca    9.4      07 Sep 2018 23:19    TPro  7.2  /  Alb  4.1  /  TBili  0.4  /  DBili  x   /  AST  28  /  ALT  19  /  AlkPhos  99  09-07                  PT/INR - ( 07 Sep 2018 23:19 )   PT: 13.1 sec;   INR: 1.21 ratio         PTT - ( 07 Sep 2018 23:19 )  PTT:> 200 sec      TELEMETRY: NSR with PVCs      IMAGING    CTPA @ OSH: Large burden PE at distal left PA that involves the upper and lower lobe segmental arteries. Small burden PE of the right segmental branches of the upper, middle and lower lobe arteries.    CT Venogram @ OSH: CT venogram at OSH with DVT in right prox and distal superficial femoral vein and popliteal vein. Probable DVT in left superficial femoral vein and popliteal vein.    < from: Xray Chest 1 View- PORTABLE-Urgent (09.07.18 @ 23:34) >    ROCEDURE DATE:  09/07/2018      ******PRELIMINARY REPORT******    ******PRELIMINARY REPORT******           INTERPRETATION:  clear lungs    < end of copied text >

## 2018-09-09 VITALS — DIASTOLIC BLOOD PRESSURE: 74 MMHG | RESPIRATION RATE: 14 BRPM | SYSTOLIC BLOOD PRESSURE: 100 MMHG | HEART RATE: 101 BPM

## 2018-09-09 DIAGNOSIS — I26.99 OTHER PULMONARY EMBOLISM WITHOUT ACUTE COR PULMONALE: ICD-10-CM

## 2018-09-09 LAB
ANION GAP SERPL CALC-SCNC: 11 MMOL/L — SIGNIFICANT CHANGE UP (ref 5–17)
APTT BLD: 55.3 SEC — HIGH (ref 27.5–37.4)
BASOPHILS # BLD AUTO: 0.1 K/UL — SIGNIFICANT CHANGE UP (ref 0–0.2)
BASOPHILS NFR BLD AUTO: 0.7 % — SIGNIFICANT CHANGE UP (ref 0–2)
BUN SERPL-MCNC: 13 MG/DL — SIGNIFICANT CHANGE UP (ref 7–23)
CALCIUM SERPL-MCNC: 9.1 MG/DL — SIGNIFICANT CHANGE UP (ref 8.4–10.5)
CHLORIDE SERPL-SCNC: 106 MMOL/L — SIGNIFICANT CHANGE UP (ref 96–108)
CO2 SERPL-SCNC: 22 MMOL/L — SIGNIFICANT CHANGE UP (ref 22–31)
CREAT SERPL-MCNC: 0.93 MG/DL — SIGNIFICANT CHANGE UP (ref 0.5–1.3)
EOSINOPHIL # BLD AUTO: 0.2 K/UL — SIGNIFICANT CHANGE UP (ref 0–0.5)
EOSINOPHIL NFR BLD AUTO: 3.1 % — SIGNIFICANT CHANGE UP (ref 0–6)
GLUCOSE SERPL-MCNC: 105 MG/DL — HIGH (ref 70–99)
HCT VFR BLD CALC: 42.3 % — SIGNIFICANT CHANGE UP (ref 34.5–45)
HGB BLD-MCNC: 14.4 G/DL — SIGNIFICANT CHANGE UP (ref 11.5–15.5)
INR BLD: 1.15 RATIO — SIGNIFICANT CHANGE UP (ref 0.88–1.16)
LYMPHOCYTES # BLD AUTO: 1.1 K/UL — SIGNIFICANT CHANGE UP (ref 1–3.3)
LYMPHOCYTES # BLD AUTO: 13.5 % — SIGNIFICANT CHANGE UP (ref 13–44)
MAGNESIUM SERPL-MCNC: 2.1 MG/DL — SIGNIFICANT CHANGE UP (ref 1.6–2.6)
MCHC RBC-ENTMCNC: 28.8 PG — SIGNIFICANT CHANGE UP (ref 27–34)
MCHC RBC-ENTMCNC: 33.9 GM/DL — SIGNIFICANT CHANGE UP (ref 32–36)
MCV RBC AUTO: 84.9 FL — SIGNIFICANT CHANGE UP (ref 80–100)
MONOCYTES # BLD AUTO: 0.6 K/UL — SIGNIFICANT CHANGE UP (ref 0–0.9)
MONOCYTES NFR BLD AUTO: 8.1 % — SIGNIFICANT CHANGE UP (ref 2–14)
NEUTROPHILS # BLD AUTO: 5.9 K/UL — SIGNIFICANT CHANGE UP (ref 1.8–7.4)
NEUTROPHILS NFR BLD AUTO: 74.5 % — SIGNIFICANT CHANGE UP (ref 43–77)
PHOSPHATE SERPL-MCNC: 3.7 MG/DL — SIGNIFICANT CHANGE UP (ref 2.5–4.5)
PLATELET # BLD AUTO: 172 K/UL — SIGNIFICANT CHANGE UP (ref 150–400)
POTASSIUM SERPL-MCNC: 4.1 MMOL/L — SIGNIFICANT CHANGE UP (ref 3.5–5.3)
POTASSIUM SERPL-SCNC: 4.1 MMOL/L — SIGNIFICANT CHANGE UP (ref 3.5–5.3)
PROTHROM AB SERPL-ACNC: 12.6 SEC — SIGNIFICANT CHANGE UP (ref 9.8–12.7)
RBC # BLD: 4.99 M/UL — SIGNIFICANT CHANGE UP (ref 3.8–5.2)
RBC # FLD: 13.9 % — SIGNIFICANT CHANGE UP (ref 10.3–14.5)
SODIUM SERPL-SCNC: 139 MMOL/L — SIGNIFICANT CHANGE UP (ref 135–145)
TSH SERPL-MCNC: 3.22 UIU/ML — SIGNIFICANT CHANGE UP (ref 0.27–4.2)
WBC # BLD: 8 K/UL — SIGNIFICANT CHANGE UP (ref 3.8–10.5)
WBC # FLD AUTO: 8 K/UL — SIGNIFICANT CHANGE UP (ref 3.8–10.5)

## 2018-09-09 PROCEDURE — 84443 ASSAY THYROID STIM HORMONE: CPT

## 2018-09-09 PROCEDURE — 80061 LIPID PANEL: CPT

## 2018-09-09 PROCEDURE — 83880 ASSAY OF NATRIURETIC PEPTIDE: CPT

## 2018-09-09 PROCEDURE — 83036 HEMOGLOBIN GLYCOSYLATED A1C: CPT

## 2018-09-09 PROCEDURE — 80053 COMPREHEN METABOLIC PANEL: CPT

## 2018-09-09 PROCEDURE — 84295 ASSAY OF SERUM SODIUM: CPT

## 2018-09-09 PROCEDURE — 85730 THROMBOPLASTIN TIME PARTIAL: CPT

## 2018-09-09 PROCEDURE — 82803 BLOOD GASES ANY COMBINATION: CPT

## 2018-09-09 PROCEDURE — 86850 RBC ANTIBODY SCREEN: CPT

## 2018-09-09 PROCEDURE — 93970 EXTREMITY STUDY: CPT

## 2018-09-09 PROCEDURE — 80048 BASIC METABOLIC PNL TOTAL CA: CPT

## 2018-09-09 PROCEDURE — 86901 BLOOD TYPING SEROLOGIC RH(D): CPT

## 2018-09-09 PROCEDURE — 84132 ASSAY OF SERUM POTASSIUM: CPT

## 2018-09-09 PROCEDURE — 82947 ASSAY GLUCOSE BLOOD QUANT: CPT

## 2018-09-09 PROCEDURE — 83735 ASSAY OF MAGNESIUM: CPT

## 2018-09-09 PROCEDURE — 84702 CHORIONIC GONADOTROPIN TEST: CPT

## 2018-09-09 PROCEDURE — 93306 TTE W/DOPPLER COMPLETE: CPT

## 2018-09-09 PROCEDURE — 85014 HEMATOCRIT: CPT

## 2018-09-09 PROCEDURE — 83605 ASSAY OF LACTIC ACID: CPT

## 2018-09-09 PROCEDURE — 84100 ASSAY OF PHOSPHORUS: CPT

## 2018-09-09 PROCEDURE — 82330 ASSAY OF CALCIUM: CPT

## 2018-09-09 PROCEDURE — 82435 ASSAY OF BLOOD CHLORIDE: CPT

## 2018-09-09 PROCEDURE — 85027 COMPLETE CBC AUTOMATED: CPT

## 2018-09-09 PROCEDURE — 84484 ASSAY OF TROPONIN QUANT: CPT

## 2018-09-09 PROCEDURE — 85610 PROTHROMBIN TIME: CPT

## 2018-09-09 PROCEDURE — 71045 X-RAY EXAM CHEST 1 VIEW: CPT

## 2018-09-09 PROCEDURE — 86900 BLOOD TYPING SEROLOGIC ABO: CPT

## 2018-09-09 PROCEDURE — 99285 EMERGENCY DEPT VISIT HI MDM: CPT

## 2018-09-09 PROCEDURE — 93005 ELECTROCARDIOGRAM TRACING: CPT

## 2018-09-09 RX ORDER — LIDOCAINE 4 G/100G
1 CREAM TOPICAL ONCE
Qty: 0 | Refills: 0 | Status: COMPLETED | OUTPATIENT
Start: 2018-09-09 | End: 2018-09-09

## 2018-09-09 RX ORDER — ACETAMINOPHEN 500 MG
650 TABLET ORAL EVERY 6 HOURS
Qty: 0 | Refills: 0 | Status: DISCONTINUED | OUTPATIENT
Start: 2018-09-09 | End: 2018-09-09

## 2018-09-09 RX ORDER — ENOXAPARIN SODIUM 100 MG/ML
120 INJECTION SUBCUTANEOUS
Qty: 30 | Refills: 3 | OUTPATIENT
Start: 2018-09-09 | End: 2019-01-06

## 2018-09-09 RX ADMIN — ENOXAPARIN SODIUM 120 MILLIGRAM(S): 100 INJECTION SUBCUTANEOUS at 01:08

## 2018-09-09 RX ADMIN — Medication 650 MILLIGRAM(S): at 05:45

## 2018-09-09 RX ADMIN — LIDOCAINE 1 PATCH: 4 CREAM TOPICAL at 06:05

## 2018-09-09 RX ADMIN — ENOXAPARIN SODIUM 120 MILLIGRAM(S): 100 INJECTION SUBCUTANEOUS at 11:44

## 2018-09-09 RX ADMIN — Medication 650 MILLIGRAM(S): at 05:10

## 2018-09-09 NOTE — PROGRESS NOTE ADULT - PROBLEM SELECTOR PLAN 2
continue Lovenox 120mg SC BID
NUMC CT Venogram shows R prox and distal superficial femoral vein and popliteal vein with defects consistent with DVT. L distal superficial vein and popliteal vein with defects suggestive of possible DVT  Lovenox ordered

## 2018-09-09 NOTE — DISCHARGE NOTE ADULT - CARE PROVIDERS DIRECT ADDRESSES
[email or web address]

## 2018-09-09 NOTE — PROGRESS NOTE ADULT - SUBJECTIVE AND OBJECTIVE BOX
Patient is a 63y old  Female who presents with a chief complaint of DVT, PE (09 Sep 2018 01:14)    HPI:  Pt is a 62 y/o F with PMH of HTN, varicose veins (baseline RLE swelling), recently dx with breast ca (s/p right lumpectomy 7/5/18), no chemo, scheduled for upcoming radiation transferred via EMS from [facility] where she was found to have PEs and b/l proximal LE DVTs, s/p 25,000 U heparin loading at 15 mL/hour). Patient initially had ALVAREZ around 2pm yesterday while at work ([facility]), resolved with rest but upon walking to her car experienced increased ALVAREZ accompanied by anxiety. Pt call her PMD who instructed her to go to the ED. Denies personal or FH of DVT or PE. Not on AC. No recent travel but admit to more sedentary lifestyle recently due to the hot weather. Pt was on continued heparin gtt at 22 mL/hr in [facility] ED, PTT >200 and held for 2 hours. Heparin restarted at 18 ml/hr upon arrival to [facility]. Currently pt denies fevers, chills, CP, SOB, palpitations, abd pain, n/v/d, weakness, LE pain, numbness or tingling. (08 Sep 2018 01:23)    Overnight Event:    REVIEW OF SYSTEMS:  	    MEDICATIONS  (STANDING):  enoxaparin Injectable 120 milliGRAM(s) SubCutaneous every 12 hours  influenza   Vaccine 0.5 milliLiter(s) IntraMuscular once    MEDICATIONS  (PRN):  acetaminophen   Tablet .. 650 milliGRAM(s) Oral every 6 hours PRN Mild Pain (1 - 3)        PHYSICAL EXAM:  Vital Signs Last 24 Hrs  T(C): 37.1 (09 Sep 2018 05:00), Max: 37.1 (08 Sep 2018 20:00)  T(F): 98.7 (09 Sep 2018 05:00), Max: 98.8 (08 Sep 2018 20:00)  HR: 77 (09 Sep 2018 06:00) (76 - 93)  BP: 113/65 (09 Sep 2018 06:00) (91/73 - 138/119)  BP(mean): 76 (09 Sep 2018 06:00) (73 - 127)  RR: 20 (09 Sep 2018 05:00) (13 - 26)  SpO2: 96% (09 Sep 2018 06:00) (92% - 100%)  I&O's Summary    08 Sep 2018 07:01  -  09 Sep 2018 07:00  --------------------------------------------------------  IN: 734 mL / OUT: 600 mL / NET: 134 mL        Appearance: Normal	  HEENT:   Normal oral mucosa, PERRL, EOMI	  Lymphatic: No lymphadenopathy  Cardiovascular: Normal S1 S2, No JVD, No murmurs, No edema  Respiratory: Lungs clear to auscultation	  Psychiatry: A & O x 3, Mood & affect appropriate  Gastrointestinal:  Soft, Non-tender, + BS	  Skin: No rashes, No ecchymoses, No cyanosis	  Neurologic: Non-focal  Extremities: Normal range of motion, No clubbing, cyanosis or edema  Vascular: Peripheral pulses palpable 2+ bilaterally    LABS:	 	                        14.4   8.0   )-----------( 172      ( 09 Sep 2018 04:26 )             42.3     Auto Eosinophil # 0.2   / Auto Eosinophil % 3.1   / Auto Neutrophil # 5.9   / Auto Neutrophil % 74.5  / BANDS % x                            14.8   8.1   )-----------( 176      ( 08 Sep 2018 06:44 )             43.7     Auto Eosinophil # x     / Auto Eosinophil % x     / Auto Neutrophil # x     / Auto Neutrophil % x     / BANDS % x                            15.6   10.9  )-----------( 186      ( 07 Sep 2018 23:19 )             47.1     Auto Eosinophil # 0.1   / Auto Eosinophil % 1.2   / Auto Neutrophil # 8.0   / Auto Neutrophil % 73.6  / BANDS % x        INR: 1.15 ratio (09-09 @ 04:26)  INR: 1.11 ratio (09-08 @ 06:44)  INR: 1.21 ratio (09-07 @ 23:19)    09-09    139  |  106  |  13  ----------------------------<  105<H>  4.1   |  22  |  0.93  09-08    142  |  106  |  13  ----------------------------<  114<H>  4.1   |  23  |  0.85  09-07    140  |  104  |  15  ----------------------------<  114<H>  4.6   |  19<L>  |  0.88    Ca    9.1      09 Sep 2018 04:25  Mg     2.1     09-09  Phos  3.7     09-09  TPro  7.2  /  Alb  4.1  /  TBili  0.4  /  DBili  x   /  AST  28  /  ALT  19  /  AlkPhos  99  09-07      proBNP: Serum Pro-Brain Natriuretic Peptide: 320 pg/mL (09-07 @ 23:19)    Lipid Profile: 09-08 Chol 186  HDL 45<L> Trig 104  HgA1c: 5.4 % (09-08 @ 08:18)    TELEMETRY: 	    ECG:  	  RADIOLOGY:  OTHER: 	    PREVIOUS DIAGNOSTIC TESTING:    [ ] Echocardiogram:  [ ]  Catheterization:      JOHNATHON HuangJack [facility]  Contact #[phone] Patient is a 63y old  Female who presents with a chief complaint of DVT, PE (09 Sep 2018 01:14)    HPI:  Pt is a 62 y/o F with PMH of HTN, varicose veins (baseline RLE swelling), recently dx with breast ca (s/p right lumpectomy 7/5/18), no chemo, scheduled for upcoming radiation transferred via EMS from [facility] where she was found to have PEs and b/l proximal LE DVTs, s/p 25,000 U heparin loading at 15 mL/hour). Patient initially had ALVAREZ around 2pm yesterday while at work ([facility]), resolved with rest but upon walking to her car experienced increased ALVAREZ accompanied by anxiety. Pt call her PMD who instructed her to go to the ED. Denies personal or FH of DVT or PE. Not on AC. No recent travel but admit to more sedentary lifestyle recently due to the hot weather. Pt was on continued heparin gtt at 22 mL/hr in [facility] ED, PTT >200 and held for 2 hours. Heparin restarted at 18 ml/hr upon arrival to [facility]. Currently pt denies fevers, chills, CP, SOB, palpitations, abd pain, n/v/d, weakness, LE pain, numbness or tingling. (08 Sep 2018 01:23)    Overnight Event: No issues overnight, just with complaints of back pain which resolved with tylenol    REVIEW OF SYSTEMS: back pain, no SOB, chest pain or palpitations upon ambulation  	    MEDICATIONS  (STANDING):  enoxaparin Injectable 120 milliGRAM(s) SubCutaneous every 12 hours  influenza   Vaccine 0.5 milliLiter(s) IntraMuscular once    MEDICATIONS  (PRN):  acetaminophen   Tablet .. 650 milliGRAM(s) Oral every 6 hours PRN Mild Pain (1 - 3)        PHYSICAL EXAM:  Vital Signs Last 24 Hrs  T(C): 37.1 (09 Sep 2018 05:00), Max: 37.1 (08 Sep 2018 20:00)  T(F): 98.7 (09 Sep 2018 05:00), Max: 98.8 (08 Sep 2018 20:00)  HR: 77 (09 Sep 2018 06:00) (76 - 93)  BP: 113/65 (09 Sep 2018 06:00) (91/73 - 138/119)  BP(mean): 76 (09 Sep 2018 06:00) (73 - 127)  RR: 20 (09 Sep 2018 05:00) (13 - 26)  SpO2: 96% (09 Sep 2018 06:00) (92% - 100%)  I&O's Summary    08 Sep 2018 07:01  -  09 Sep 2018 07:00  --------------------------------------------------------  IN: 734 mL / OUT: 600 mL / NET: 134 mL      Appearance: Normal	  HEENT:   Normal oral mucosa, PERRL, EOMI	  Cardiovascular: Normal S1 S2, No JVD, No murmurs, No edema  Respiratory: Lungs clear to auscultation	  Psychiatry: A & O x 3, Mood & affect appropriate  Gastrointestinal:  Obese, Soft, Non-tender, + BS	  Skin: No rashes, No ecchymoses, No cyanosis	  Neurologic: Non-focal  Extremities: Normal range of motion, No clubbing, cyanosis or edema  Vascular: Peripheral pulses palpable 2+ bilaterally    LABS:	 	                        14.4   8.0   )-----------( 172      ( 09 Sep 2018 04:26 )             42.3     Auto Eosinophil # 0.2   / Auto Eosinophil % 3.1   / Auto Neutrophil # 5.9   / Auto Neutrophil % 74.5  / BANDS % x                            14.8   8.1   )-----------( 176      ( 08 Sep 2018 06:44 )             43.7     Auto Eosinophil # x     / Auto Eosinophil % x     / Auto Neutrophil # x     / Auto Neutrophil % x     / BANDS % x                            15.6   10.9  )-----------( 186      ( 07 Sep 2018 23:19 )             47.1     Auto Eosinophil # 0.1   / Auto Eosinophil % 1.2   / Auto Neutrophil # 8.0   / Auto Neutrophil % 73.6  / BANDS % x        INR: 1.15 ratio (09-09 @ 04:26)  INR: 1.11 ratio (09-08 @ 06:44)  INR: 1.21 ratio (09-07 @ 23:19)    09-09    139  |  106  |  13  ----------------------------<  105<H>  4.1   |  22  |  0.93  09-08    142  |  106  |  13  ----------------------------<  114<H>  4.1   |  23  |  0.85  09-07    140  |  104  |  15  ----------------------------<  114<H>  4.6   |  19<L>  |  0.88    Ca    9.1      09 Sep 2018 04:25  Mg     2.1     09-09  Phos  3.7     09-09  TPro  7.2  /  Alb  4.1  /  TBili  0.4  /  DBili  x   /  AST  28  /  ALT  19  /  AlkPhos  99  09-07      proBNP: Serum Pro-Brain Natriuretic Peptide: 320 pg/mL (09-07 @ 23:19)    Lipid Profile: 09-08 Chol 186  HDL 45<L> Trig 104  HgA1c: 5.4 % (09-08 @ 08:18)    TELEMETRY: No ectopy	    ECG:  	NSR 70-80's   	    PREVIOUS DIAGNOSTIC TESTING:    [ ] Echocardiogram: < from: Transthoracic Echocardiogram (09.08.18 @ 09:46) >  Conclusions: 63%  1. Normal left ventricular internal dimensions and wall  thicknesses.  2. Normal left ventricular systolic function. Septal motion  consistent with right ventricular overload.  3. Right atrial enlargement.  4. Right ventricular enlargement with decreased right  ventricular systolic function.  5. Estimated pulmonary artery systolic pressure equals 36  mm Hg, assuming right atrial pressure equals 8 mm Hg,  consistent with borderline pulmonary pressures.    < end of copied text >      JOHNATHON HuangBryan [facility]  Contact #[phone]

## 2018-09-09 NOTE — DISCHARGE NOTE ADULT - NS AS ACTIVITY OBS
Driving allowed/Walking-Indoors allowed/Stairs allowed/Walking-Outdoors allowed/Return to Work/School allowed

## 2018-09-09 NOTE — DISCHARGE NOTE ADULT - HOSPITAL COURSE
Pt is a 62 y/o F with PMH of HTN, varicose veins (baseline RLE swelling), recently dx with breast ca (s/p right lumpectomy 7/5/18), no chemo, scheduled for upcoming radiation transferred via EMS from [facility] where she was found to have PEs and b/l proximal LE DVTs, s/p 25,000 U heparin loading at 15 mL/hour). Patient initially had ALVAREZ around 2pm yesterday while at work ([facility]), resolved with rest but upon walking to her car experienced increased ALVAREZ accompanied by anxiety. Pt call her PMD who instructed her to go to the ED. Denies personal or FH of DVT or PE. Not on AC. No recent travel but admit to more sedentary lifestyle recently due to the hot weather. Pt was on continued heparin gtt at 22 mL/hr in [facility] ED, PTT >200 and held for 2 hours. Heparin restarted at 18 ml/hr upon arrival to [facility] 9/8. Pt's ALVAREZ greatly improved, VSS. Pt transitioned to Lovenox and educated on self administration.

## 2018-09-09 NOTE — DISCHARGE NOTE ADULT - CARE PLAN
Principal Discharge DX:	Other acute pulmonary embolism without acute cor pulmonale  Goal:	Prevent recurrent pulmonary embolisms  Assessment and plan of treatment:	Take your Lovenox as prescribed.  Walking is encouraged, increase activity as tolerated.  If you develop new chest pain with difficulty breathing, a rapid heart rate and/or a feeling of passing out call emergency medical services 911.  Lovenox is used to thin the blood, to prevent and treat blood clots.  Take this medication Daily as prescribed by your Health Care Provider.  Tell your Dentist, Surgeon, and other Doctors that you are on this drug.  Secondary Diagnosis:	DVT, lower extremity, proximal, acute, bilateral  Goal:	Prevent recurrent DVTs  Assessment and plan of treatment:	Take your Lovenox as prescribed.  Walking is encouraged, increase activity as tolerated.  If you develop new leg pain, swelling, and/or redness contact your healthcare provider.  If you develop new chest pain with difficulty breathing, a rapid heart rate and/or a feeling of passing out call emergency medical services 911.  Lovenox is used to thin the blood, to prevent and treat blood clots.  Take this medication Daily as prescribed by your Health Care Provider.  Tell your Dentist, Surgeon, and other Doctors that you are on this drug.

## 2018-09-09 NOTE — DISCHARGE NOTE ADULT - MEDICATION SUMMARY - MEDICATIONS TO TAKE
I will START or STAY ON the medications listed below when I get home from the hospital:    Lovenox 120 mg/0.8 mL injectable solution  -- 120 milligram(s) subcutaneously every 12 hours   -- It is very important that you take or use this exactly as directed.  Do not skip doses or discontinue unless directed by your doctor.    -- Indication: For Other acute pulmonary embolism without acute cor pulmonale

## 2018-09-09 NOTE — DISCHARGE NOTE ADULT - CARE PROVIDER_API CALL
Juan Carlos Licona (DO), Internal Medicine; Nuclear Cardiology  [address] [place] [address]  Phone: [phone]  Fax: [phone]

## 2018-09-09 NOTE — PROGRESS NOTE ADULT - PROBLEM SELECTOR PLAN 1
Acute PE with evidence of RV strain on echo but no hemodynamic instability and adequate oxygenation upon ambulation  Lovenox 120mg SC BID  Observe self injection and teaching for home administartation Acute PE with evidence of RV strain on echo but no hemodynamic instability and adequate oxygenation upon ambulation  Lovenox 120mg SC BID  Observe self injection and teaching for home administration

## 2018-09-09 NOTE — DISCHARGE NOTE ADULT - PATIENT PORTAL LINK FT
You can access the [facility] Patient Portal, offered by [facility], by registering with the following website: [email or web address]

## 2018-09-09 NOTE — DISCHARGE NOTE ADULT - PLAN OF CARE
Prevent recurrent pulmonary embolisms Take your Lovenox as prescribed.  Walking is encouraged, increase activity as tolerated.  If you develop new chest pain with difficulty breathing, a rapid heart rate and/or a feeling of passing out call emergency medical services 911.  Lovenox is used to thin the blood, to prevent and treat blood clots.  Take this medication Daily as prescribed by your Health Care Provider.  Tell your Dentist, Surgeon, and other Doctors that you are on this drug. Prevent recurrent DVTs Take your Lovenox as prescribed.  Walking is encouraged, increase activity as tolerated.  If you develop new leg pain, swelling, and/or redness contact your healthcare provider.  If you develop new chest pain with difficulty breathing, a rapid heart rate and/or a feeling of passing out call emergency medical services 911.  Lovenox is used to thin the blood, to prevent and treat blood clots.  Take this medication Daily as prescribed by your Health Care Provider.  Tell your Dentist, Surgeon, and other Doctors that you are on this drug.

## 2019-05-06 PROBLEM — I83.90 ASYMPTOMATIC VARICOSE VEINS OF UNSPECIFIED LOWER EXTREMITY: Chronic | Status: ACTIVE | Noted: 2018-09-07

## 2019-05-06 PROBLEM — C50.919 MALIGNANT NEOPLASM OF UNSPECIFIED SITE OF UNSPECIFIED FEMALE BREAST: Chronic | Status: ACTIVE | Noted: 2018-09-07

## 2019-05-10 ENCOUNTER — APPOINTMENT (OUTPATIENT)
Dept: MRI IMAGING | Facility: CLINIC | Age: 64
End: 2019-05-10
Payer: MEDICARE

## 2019-05-10 ENCOUNTER — OUTPATIENT (OUTPATIENT)
Dept: OUTPATIENT SERVICES | Facility: HOSPITAL | Age: 64
LOS: 1 days | End: 2019-05-10
Payer: MEDICARE

## 2019-05-10 DIAGNOSIS — Z98.890 OTHER SPECIFIED POSTPROCEDURAL STATES: Chronic | ICD-10-CM

## 2019-05-10 DIAGNOSIS — Z90.710 ACQUIRED ABSENCE OF BOTH CERVIX AND UTERUS: Chronic | ICD-10-CM

## 2019-05-10 DIAGNOSIS — Z00.8 ENCOUNTER FOR OTHER GENERAL EXAMINATION: ICD-10-CM

## 2019-05-10 DIAGNOSIS — C50.911 MALIGNANT NEOPLASM OF UNSPECIFIED SITE OF RIGHT FEMALE BREAST: ICD-10-CM

## 2019-05-10 PROCEDURE — 77049 MRI BREAST C-+ W/CAD BI: CPT | Mod: 26

## 2019-05-10 PROCEDURE — A9585: CPT

## 2019-05-10 PROCEDURE — C8937: CPT

## 2019-05-10 PROCEDURE — C8908: CPT

## 2020-03-31 NOTE — H&P ADULT - DOES THIS PATIENT HAVE A HISTORY OF OR HAS BEEN DX WITH HEART FAILURE?
Outpatient Care Management Note:    Re: pt continues to follow up with podiatrist  No additional community or in home resources needed at this time  Provided care managers phone number and department phone if future assistance needed  Closed to outpatient care management at this time  no

## 2020-05-19 NOTE — PROGRESS NOTE ADULT - ATTENDING COMMENTS
Patient here for every 21 days b12 injection as ordered by Dr. Leo Park on 4/27/20. B12 injection given right arm. Well tolerated by patient. Patient to call to schedule next B12 injection.
Patient presents with acute PE, now ambulating comfortably without hypoxia.  Transitioned to Lovenox prior to discharge.
Acute PE with evidence of RV strain on echo but no hemodynamic instability, adequate oxygenation, and minimal cardiac enzyme leak.    Continue anticoagulation.    Ambulate and monitor oxygenation.

## 2020-07-07 ENCOUNTER — APPOINTMENT (OUTPATIENT)
Dept: MRI IMAGING | Facility: CLINIC | Age: 65
End: 2020-07-07
Payer: MEDICARE

## 2020-07-07 ENCOUNTER — OUTPATIENT (OUTPATIENT)
Dept: OUTPATIENT SERVICES | Facility: HOSPITAL | Age: 65
LOS: 1 days | End: 2020-07-07
Payer: MEDICARE

## 2020-07-07 DIAGNOSIS — Z98.890 OTHER SPECIFIED POSTPROCEDURAL STATES: Chronic | ICD-10-CM

## 2020-07-07 DIAGNOSIS — C50.911 MALIGNANT NEOPLASM OF UNSPECIFIED SITE OF RIGHT FEMALE BREAST: ICD-10-CM

## 2020-07-07 DIAGNOSIS — Z90.710 ACQUIRED ABSENCE OF BOTH CERVIX AND UTERUS: Chronic | ICD-10-CM

## 2020-07-07 PROCEDURE — 77049 MRI BREAST C-+ W/CAD BI: CPT | Mod: 26

## 2020-07-07 PROCEDURE — A9585: CPT

## 2020-07-07 PROCEDURE — C8908: CPT

## 2020-07-07 PROCEDURE — C8937: CPT

## 2022-11-21 ENCOUNTER — RESULT REVIEW (OUTPATIENT)
Age: 67
End: 2022-11-21

## 2023-01-28 NOTE — PROGRESS NOTE ADULT - REASON FOR ADMISSION
2130: Cards notified about VBG results coming in with a CO2 of 76 with no improvement in patients LOC. Requested provider to come to bedside to assess patient.    Cards called back after assessing patient and alerted nurse that patient will likely be transferring to the ICU.   DVT, PE

## 2023-04-20 NOTE — PROGRESS NOTE ADULT - ASSESSMENT
Not at goal.  She needs to see her gastroenterologist at [facility] [employer] [facility] clinic Dr. Alok Fowler but is having insurance problems.   Meantime she will continue her pantoprazole 40 mg a day try and avoid acidic foods ,use her ondansetron
continue metoprolol succinate 25 mg a day as it appears to be suppressing her ventricular tachycardia.   Follow-up with cardiology as directed
fear iron deficiency anemia. Hemoglobin ranges from 5 to 6 g. This is her baseline.   She continues on her IV iron weekly and follows up with oncology nurse practitioner
increase protein rich foods in diet. Avoid acidic foods because of GI problems.
recurrent kidney stones. May need referral to nephrology for further evaluation.   In the meantime maintain adequate hydration
unable to get her to [facility] to see her GI doctor right now. Hoping to resolve insurance issues soon.   In the meantime continue her pantoprazole and monitor her CBCs
use her tizanidine as needed. .  Use Tylenol as needed
64 y/o with PMH of HTN, varicose veins (baseline RLE swelling), recently dx with breast ca (s/p right lumpectomy 7/5/18), no chemo, scheduled for upcoming radiation transferred via EMS from [facility] where she was found to have PEs and b/l proximal LE DVTs, s/p 25,000 U heparin loading at 15 mL/hour). Patient initially had ALVAREZ around 2pm yesterday while at work ([facility]), resolved with rest but upon walking to her car experienced increased ALVAREZ accompanied by anxiety.  admitted for pulmonary embolism
This is a 63 year old woman with PMH of HTN, varicose veins (baseline RLE swelling), recently dx with breast ca (s/p right lumpectomy 7/5/18), no chemo, scheduled for upcoming radiation transferred via EMS from [facility] where she was found to have PEs and b/l proximal LE DVTs, s/p 25,000 U heparin loading at 15 mL/hour). Patient initially had ALVAREZ around 2pm yesterday while at work ([facility]), resolved with rest but upon walking to her car experienced increased ALVAREZ accompanied by anxiety. Pt call her PMD who instructed her to go to the ED. Denies personal or FH of DVT or PE. Not on AC. No recent travel but admit to more sedentary lifestyle recently due to the hot weather. Pt started on Heparin.

## 2023-05-16 NOTE — ED PROVIDER NOTE - PRINCIPAL DIAGNOSIS
Pulmonary embolism Methotrexate Pregnancy And Lactation Text: This medication is Pregnancy Category X and is known to cause fetal harm. This medication is excreted in breast milk.

## 2024-01-21 NOTE — H&P ADULT - PROBLEM SELECTOR PLAN 1
- CTPA at OSH with multiple PEs  - Cont heparin full AC nomogram  - desat in ED to 88% on RA. Now satting 100% on 4L NC  - bedside TTE done, f/u results  - f/u repeat hsT, initially 198 - CTPA at OSH with large burden PE at distal left PA that involves the upper and lower lobe segmental arteries. Small burden PE of the right segmental branches of the upper, middle and lower lobe arteries.  - Need for embolectomy vs home Lovenox TBD  - Cont heparin full AC nomogram  - desat in ED to 88% on RA. Now satting 100% on 4L NC  - TTE stat  - f/u repeat hsT, initially 198 friend Lois/patient